# Patient Record
Sex: FEMALE | Race: WHITE | URBAN - METROPOLITAN AREA
[De-identification: names, ages, dates, MRNs, and addresses within clinical notes are randomized per-mention and may not be internally consistent; named-entity substitution may affect disease eponyms.]

---

## 2022-08-22 ENCOUNTER — OFFICE VISIT (OUTPATIENT)
Dept: HEMATOLOGY | Age: 72
End: 2022-08-22
Payer: MEDICARE

## 2022-08-22 ENCOUNTER — HOSPITAL ENCOUNTER (OUTPATIENT)
Dept: LAB | Age: 72
Discharge: HOME | End: 2022-08-22
Payer: MEDICARE

## 2022-08-22 VITALS
TEMPERATURE: 97.5 F | SYSTOLIC BLOOD PRESSURE: 146 MMHG | OXYGEN SATURATION: 98 % | HEIGHT: 62 IN | BODY MASS INDEX: 24.87 KG/M2 | WEIGHT: 135.13 LBS | DIASTOLIC BLOOD PRESSURE: 84 MMHG | HEART RATE: 87 BPM

## 2022-08-22 DIAGNOSIS — R74.8 ELEVATED LIVER ENZYMES: Primary | ICD-10-CM

## 2022-08-22 DIAGNOSIS — Z11.59 ENCOUNTER FOR SCREENING FOR OTHER VIRAL DISEASES: ICD-10-CM

## 2022-08-22 DIAGNOSIS — R94.5 ABNORMAL RESULTS OF LIVER FUNCTION STUDIES: ICD-10-CM

## 2022-08-22 DIAGNOSIS — R74.8 ELEVATED LIVER ENZYMES: ICD-10-CM

## 2022-08-22 PROBLEM — K76.89 LIVER CYST: Status: ACTIVE | Noted: 2022-08-22

## 2022-08-22 PROBLEM — Z90.49 HISTORY OF CHOLECYSTECTOMY: Status: ACTIVE | Noted: 2022-08-22

## 2022-08-22 LAB
ALBUMIN SERPL-MCNC: 3.8 G/DL (ref 3.4–5)
ALBUMIN/GLOB SERPL: 1.2 {RATIO} (ref 0.8–1.7)
ALP SERPL-CCNC: 105 U/L (ref 45–117)
ALT SERPL-CCNC: 35 U/L (ref 13–56)
ANION GAP SERPL CALC-SCNC: 6 MMOL/L (ref 3–18)
AST SERPL-CCNC: 26 U/L (ref 10–38)
BASOPHILS # BLD: 0 K/UL (ref 0–0.1)
BASOPHILS NFR BLD: 0 % (ref 0–2)
BILIRUB DIRECT SERPL-MCNC: 0.2 MG/DL (ref 0–0.2)
BILIRUB SERPL-MCNC: 0.5 MG/DL (ref 0.2–1)
BUN SERPL-MCNC: 11 MG/DL (ref 7–18)
BUN/CREAT SERPL: 17 (ref 12–20)
CALCIUM SERPL-MCNC: 9.1 MG/DL (ref 8.5–10.1)
CHLORIDE SERPL-SCNC: 99 MMOL/L (ref 100–111)
CO2 SERPL-SCNC: 30 MMOL/L (ref 21–32)
CREAT SERPL-MCNC: 0.63 MG/DL (ref 0.6–1.3)
DIFFERENTIAL METHOD BLD: ABNORMAL
EOSINOPHIL # BLD: 0.5 K/UL (ref 0–0.4)
EOSINOPHIL NFR BLD: 6 % (ref 0–5)
ERYTHROCYTE [DISTWIDTH] IN BLOOD BY AUTOMATED COUNT: 14.5 % (ref 11.6–14.5)
FERRITIN SERPL-MCNC: 58 NG/ML (ref 8–388)
GLOBULIN SER CALC-MCNC: 3.2 G/DL (ref 2–4)
GLUCOSE SERPL-MCNC: 129 MG/DL (ref 74–99)
HCT VFR BLD AUTO: 39.4 % (ref 35–45)
HGB BLD-MCNC: 12.6 G/DL (ref 12–16)
IMM GRANULOCYTES # BLD AUTO: 0 K/UL (ref 0–0.04)
IMM GRANULOCYTES NFR BLD AUTO: 0 % (ref 0–0.5)
INR PPP: 0.9 (ref 0.8–1.2)
IRON SATN MFR SERPL: 18 % (ref 20–50)
IRON SERPL-MCNC: 61 UG/DL (ref 50–175)
LYMPHOCYTES # BLD: 1 K/UL (ref 0.9–3.6)
LYMPHOCYTES NFR BLD: 14 % (ref 21–52)
MCH RBC QN AUTO: 28.1 PG (ref 24–34)
MCHC RBC AUTO-ENTMCNC: 32 G/DL (ref 31–37)
MCV RBC AUTO: 87.8 FL (ref 78–100)
MONOCYTES # BLD: 0.7 K/UL (ref 0.05–1.2)
MONOCYTES NFR BLD: 10 % (ref 3–10)
NEUTS SEG # BLD: 4.9 K/UL (ref 1.8–8)
NEUTS SEG NFR BLD: 70 % (ref 40–73)
NRBC # BLD: 0 K/UL (ref 0–0.01)
NRBC BLD-RTO: 0 PER 100 WBC
PLATELET # BLD AUTO: 379 K/UL (ref 135–420)
PMV BLD AUTO: 8.8 FL (ref 9.2–11.8)
POTASSIUM SERPL-SCNC: 3.8 MMOL/L (ref 3.5–5.5)
PROT SERPL-MCNC: 7 G/DL (ref 6.4–8.2)
PROTHROMBIN TIME: 13 SEC (ref 11.5–15.2)
RBC # BLD AUTO: 4.49 M/UL (ref 4.2–5.3)
SODIUM SERPL-SCNC: 135 MMOL/L (ref 136–145)
TIBC SERPL-MCNC: 340 UG/DL (ref 250–450)
WBC # BLD AUTO: 7.1 K/UL (ref 4.6–13.2)

## 2022-08-22 PROCEDURE — 86381 MITOCHONDRIAL ANTIBODY EACH: CPT

## 2022-08-22 PROCEDURE — 1123F ACP DISCUSS/DSCN MKR DOCD: CPT | Performed by: INTERNAL MEDICINE

## 2022-08-22 PROCEDURE — 3017F COLORECTAL CA SCREEN DOC REV: CPT | Performed by: INTERNAL MEDICINE

## 2022-08-22 PROCEDURE — 99204 OFFICE O/P NEW MOD 45 MIN: CPT | Performed by: INTERNAL MEDICINE

## 2022-08-22 PROCEDURE — 86708 HEPATITIS A ANTIBODY: CPT

## 2022-08-22 PROCEDURE — 86704 HEP B CORE ANTIBODY TOTAL: CPT

## 2022-08-22 PROCEDURE — 82103 ALPHA-1-ANTITRYPSIN TOTAL: CPT

## 2022-08-22 PROCEDURE — 83540 ASSAY OF IRON: CPT

## 2022-08-22 PROCEDURE — G8400 PT W/DXA NO RESULTS DOC: HCPCS | Performed by: INTERNAL MEDICINE

## 2022-08-22 PROCEDURE — 86706 HEP B SURFACE ANTIBODY: CPT

## 2022-08-22 PROCEDURE — 86015 ACTIN ANTIBODY EACH: CPT

## 2022-08-22 PROCEDURE — 36415 COLL VENOUS BLD VENIPUNCTURE: CPT

## 2022-08-22 PROCEDURE — 85025 COMPLETE CBC W/AUTO DIFF WBC: CPT

## 2022-08-22 PROCEDURE — 85610 PROTHROMBIN TIME: CPT

## 2022-08-22 PROCEDURE — 82728 ASSAY OF FERRITIN: CPT

## 2022-08-22 PROCEDURE — 86037 ANCA TITER EACH ANTIBODY: CPT

## 2022-08-22 PROCEDURE — 86803 HEPATITIS C AB TEST: CPT

## 2022-08-22 PROCEDURE — 80076 HEPATIC FUNCTION PANEL: CPT

## 2022-08-22 PROCEDURE — G8536 NO DOC ELDER MAL SCRN: HCPCS | Performed by: INTERNAL MEDICINE

## 2022-08-22 PROCEDURE — 1101F PT FALLS ASSESS-DOCD LE1/YR: CPT | Performed by: INTERNAL MEDICINE

## 2022-08-22 PROCEDURE — G8427 DOCREV CUR MEDS BY ELIG CLIN: HCPCS | Performed by: INTERNAL MEDICINE

## 2022-08-22 PROCEDURE — G8420 CALC BMI NORM PARAMETERS: HCPCS | Performed by: INTERNAL MEDICINE

## 2022-08-22 PROCEDURE — 86038 ANTINUCLEAR ANTIBODIES: CPT

## 2022-08-22 PROCEDURE — G0463 HOSPITAL OUTPT CLINIC VISIT: HCPCS | Performed by: INTERNAL MEDICINE

## 2022-08-22 PROCEDURE — G8510 SCR DEP NEG, NO PLAN REQD: HCPCS | Performed by: INTERNAL MEDICINE

## 2022-08-22 PROCEDURE — 80048 BASIC METABOLIC PNL TOTAL CA: CPT

## 2022-08-22 PROCEDURE — 1090F PRES/ABSN URINE INCON ASSESS: CPT | Performed by: INTERNAL MEDICINE

## 2022-08-22 PROCEDURE — 87340 HEPATITIS B SURFACE AG IA: CPT

## 2022-08-22 RX ORDER — ATORVASTATIN CALCIUM 20 MG/1
TABLET, FILM COATED ORAL DAILY
COMMUNITY

## 2022-08-22 RX ORDER — PYRIDOXINE HCL (VITAMIN B6) 100 MG
100 TABLET ORAL DAILY
COMMUNITY

## 2022-08-22 RX ORDER — LOSARTAN POTASSIUM 25 MG/1
TABLET ORAL DAILY
COMMUNITY

## 2022-08-22 RX ORDER — CLOPIDOGREL BISULFATE 75 MG/1
TABLET ORAL
COMMUNITY

## 2022-08-22 RX ORDER — GLUCOSAMINE/CHONDR SU A SOD 750-600 MG
TABLET ORAL
COMMUNITY

## 2022-08-22 RX ORDER — AMLODIPINE BESYLATE 2.5 MG/1
TABLET ORAL DAILY
COMMUNITY

## 2022-08-22 RX ORDER — UREA 10 %
100 LOTION (ML) TOPICAL DAILY
COMMUNITY

## 2022-08-22 RX ORDER — CHOLECALCIFEROL (VITAMIN D3) 50 MCG
CAPSULE ORAL
COMMUNITY

## 2022-08-22 RX ORDER — CHOLECALCIFEROL (VITAMIN D3) 125 MCG
CAPSULE ORAL
COMMUNITY

## 2022-08-22 RX ORDER — OMEPRAZOLE 20 MG/1
20 TABLET, DELAYED RELEASE ORAL DAILY
COMMUNITY

## 2022-08-22 RX ORDER — ESCITALOPRAM OXALATE 5 MG/1
TABLET ORAL DAILY
COMMUNITY

## 2022-08-22 RX ORDER — CYANOCOBALAMIN (VITAMIN B-12) 1000 MCG
1 TABLET, EXTENDED RELEASE ORAL DAILY
COMMUNITY

## 2022-08-22 NOTE — PROGRESS NOTES
[facility] [facility], MD, [address], [facility], [place]      SHANKAR Yin, [facility]     April S Clifton, [facility]   APRIL Singh, [facility]       [facility]aryghulam [facility] 136    at [address]    [address] [place] [address] Ave, [address] Anca Uribe  22.    [phone]    FAX: [address] [place] [address] Avenue    [address], [address], [address]    [phone]    FAX: [phone]           Patient Care Team:  Rabia Valenzuela as PCP - General (Family Medicine)      Problem List  Never Reviewed            Codes Class Noted    History of cholecystectomy ICD-10-CM: Z90.49  ICD-9-CM: V45.79  8/22/2022        Liver cyst ICD-10-CM: K76.89  ICD-9-CM: 573.8  8/22/2022             The clinicians listed above have asked me to see Becky Rosario in consultation regarding a liver cyst.  All medical records sent by the referring physicians were reviewed including imaging studies     The patient is a 66 y.o.  female without any history of previous liver disease. The patient underwent an ultrasound for evaluation of non-specific abdominal pain in 2014. This demonstrated a cyst in the right lobe 2.9 x 2.8 cm and in the left lobe 1.6 x 1.2 cm. A repeat US in 3/2022 demonstrated a few cysts in the liver th largest in the right lobe measuring 5.4 x 6.5 and 6.6 x 5.9 cm     Both of the cysts in the right lobe were aspirated in 4/2022. Both had about 150 cc of clear fluid. She says the abdominal pain she was experiencing resolved. Repeat US in 5/2022 showed 2 cysts inn the right lobe measuring 7 cm and 4.5 cm. A third cysts in the left lobe is 3.6 cm. No liver cancer makers have been ordered to date or the results are not available to me.     The patient does not have any symptoms which could be attributed to the liver disorder. The patient is not experiencing the following symptoms which are commonly seen in this liver disorder:   fatigue,     The patient completes all daily activities without any functional limitations. ASSESSMENT AND PLAN:  The patient has a few medium sized cysts in the right and left lobe. Liver transaminases are normal.  ALP is normal.  Liver function is normal.  The platelet count is normal.      Based upon laboratory studies and imaging the patient does not appear to have liver injury. Serologic testing for causes of chronic liver disease was negative     Liver cysts are common and benign over 99% of the time. They are not responsive to hormonal therapy and HRT or OCH do not need to be stopped. Cysts rarely grow over time but if they do this is usually slow and still doesnot suggest malignancy. The patient felt discomfort and so the cysts were aspirated and she felt relief. Repeat scanning after cyst aspiration suggests the cysts may now be bigger which is not unexpected. Despite this the pain has not recurred. Will repeat CT scan in 6 months just to document the cyst is not growing. If no change in the size or shape of the cyst is seen then no further follow-up is required. Screening for Hepatocellular Carcinoma  HCC screening is not necessary if the patient has no evidence of cirrhosis. Treatment of other medical problems in patients with chronic liver disease  There are no contraindications for the patient to take most medications that are necessary for treatment of other medical issues. Counseling for alcohol in patients with chronic liver disease  The patient was counseled regarding alcohol consumption and the effect of alcohol on chronic liver disease. The patient does not consume any significant amount of alcohol.     Vaccinations   Since the patient does not have a chronic liver disease which can lead to liver injury screening for HAV and HBV is not needed. Routine vaccinations against other bacterial and viral agents can be performed as indicated. Annual flu vaccination should be administered if indicated. ALLERGIES  Not on File    MEDICATIONS  Current Outpatient Medications   Medication Sig    omeprazole (PriLOSEC OTC) 20 mg tablet Take 20 mg by mouth daily. losartan (COZAAR) 25 mg tablet Take  by mouth daily. clopidogreL (Plavix) 75 mg tab Take  by mouth. atorvastatin (Lipitor) 20 mg tablet Take  by mouth daily. escitalopram oxalate (Lexapro) 5 mg tablet Take  by mouth daily. amLODIPine (NORVASC) 2.5 mg tablet Take  by mouth daily. iron, carbonyl (FEOSOL) 45 mg tab Take 1 Tablet by mouth daily. cholecalciferol, vitamin D3, (Vitamin D3) 50 mcg (2,000 unit) tab Take  by mouth.    pyridoxine, vitamin B6, (Vitamin B-6) 100 mg tablet Take 100 mg by mouth daily. cyanocobalamin (Vitamin B-12) 100 mcg tablet Take 100 mcg by mouth daily. Biotin 2,500 mcg cap Take  by mouth.    calcium carb/vitamin D3/vit K1 (VIACTIV PO) Take  by mouth. B.animalis,bifid,infantis,long (Probiotic 4X) 10-15 mg TbEC Take  by mouth. No current facility-administered medications for this visit. SYSTEM REVIEW NOT RELATED TO LIVER DISEASE OR REVIEWED ABOVE:  Constitution systems: Negative for fever, chills, weight gain, weight loss. Eyes: Negative for visual changes. ENT: Negative for sore throat, painful swallowing. Respiratory: Negative for cough, hemoptysis, SOB. Cardiology: Negative for chest pain, palpitations. GI:  Negative for constipation or diarrhea. : Negative for urinary frequency, dysuria, hematuria, nocturia. Skin: Negative for rash. Hematology: Negative for easy bruising, blood clots. Musculo-skelatal: Negative for back pain, muscle pain, weakness. Neurologic: Negative for headaches, dizziness, vertigo, memory problems not related to HE.   Psychology: Negative for anxiety, depression. FAMILY HISTORY:  The father  of MI. The mother  at age 80 years of sepsis. There is no family history of liver disease. SOCIAL HISTORY:  The patient is . The patient has 2 children, and 2 grandchildren. The patient has never used tobacco products. The patient consumes 3-5 alcoholic beverages per week. The patient currently works part time as as realtor/property staging . PHYSICAL EXAMINATION:  Visit Vitals  BP (!) 146/84   Pulse 87   Temp 97.5 °F (36.4 °C) (Tympanic)   Ht 5' 2\" (1.575 m)   Wt 135 lb 2 oz (61.3 kg)   SpO2 98%   BMI 24.71 kg/m²       General: No acute distress. Eyes: Sclera anicteric. ENT: No oral lesions. Thyroid normal.  Nodes: No adenopathy. Skin: No spider angiomata. No jaundice. No palmar erythema. Respiratory: Lungs clear to auscultation. Cardiovascular: Regular heart rate. No murmurs. No JVD. Abdomen: Soft non-tender, liver size normal to percussion/palpation. Spleen not palpable. No obvious ascites. Extremities: No edema. No muscle wasting. No gross arthritic changes. Neurologic: Alert and oriented. Cranial nerves grossly intact. No asterixis.       LABORATORY STUDIES:  Liver [place] of [address] Units 2022   WBC 4.6 - 13.2 K/uL 7.1   ANC 1.8 - 8.0 K/UL 4.9   HGB 12.0 - 16.0 g/dL 12.6    - 420 K/uL 379   INR 0.8 - 1.2   0.9   AST 10 - 38 U/L 26   ALT 13 - 56 U/L 35   Alk Phos 45 - 117 U/L 105   Bili, Total 0.2 - 1.0 MG/DL 0.5   Bili, Direct 0.0 - 0.2 MG/DL 0.2   Albumin 3.4 - 5.0 g/dL 3.8   BUN 7.0 - 18 MG/DL 11   Creat 0.6 - 1.3 MG/DL 0.63   Na 136 - 145 mmol/L 135 (L)   K 3.5 - 5.5 mmol/L 3.8   Cl 100 - 111 mmol/L 99 (L)   CO2 21 - 32 mmol/L 30   Glucose 74 - 99 mg/dL 129 (H)       SEROLOGIES:  Serologies Latest Ref Rng & Units 2022   Hep A Ab, Total Negative   Negative   Hep B Surface Ag <1.00 Index <0.10   Hep B Surface Ag Interp NEG   Negative   Hep B Core Ab, Total Negative   Negative   Hep B Surface Ab >10.0 mIU/mL <3.10 (L)   Hep B Surface Ab Interp POS   Negative (A)   Hep C Ab 0.0 - 0.9 s/co ratio <0.1   Ferritin 8 - 388 NG/ML 58   Iron % Saturation 20 - 50 % 18 (L)   GOSIA, IFA  Negative   C-ANCA Neg:<1:20 titer <1:20   P-ANCA Neg:<1:20 titer <1:20   ANCA Neg:<1:20 titer <1:20   ASMCA 0 - 19 Units 3   M2 Ab 0.0 - 20.0 Units <20.0   Alpha-1 antitrypsin level 101 - 187 mg/dL 144     LIVER HISTOLOGY:  Not available or performed    ENDOSCOPIC PROCEDURES:  Not available or performed    RADIOLOGY:  Not available or performed    OTHER TESTING:  Not available or performed    FOLLOW-UP:  All of the issues listed above in the Assessment and Plan were discussed with the patient. All questions were answered. The patient expressed a clear understanding of the above. [address] [place] [address] in 4 weeks for [facility] to review all data and determine the treatment plan.       MD Esperanza Miller [address], [address] [phone]  FAX: [address] [place] [address]

## 2022-08-22 NOTE — Clinical Note
9/14/2022    Patient: Acacia Sanon   YOB: 1950   Date of Visit: 8/22/2022     Tobias Khan MD  [address] [place] [id number]  Via Fax: [phone]    Dear Tobias Khan MD,      Thank you for referring Ms. Claudia Fall to [address] [place] [address],[address] for evaluation. My notes for this consultation are attached. If you have questions, please do not hesitate to call me. I look forward to following your patient along with you.       Sincerely,    Nolan Cushing, MD

## 2022-08-23 LAB
A1AT SERPL-MCNC: 144 MG/DL (ref 101–187)
C-ANCA TITR SER IF: NORMAL TITER
HAV AB SER QL IA: NEGATIVE
HBV CORE AB SERPL QL IA: NEGATIVE
HBV SURFACE AB SER QL IA: NEGATIVE
HBV SURFACE AB SERPL IA-ACNC: <3.1 MIU/ML
HBV SURFACE AG SER QL: <0.1 INDEX
HBV SURFACE AG SER QL: NEGATIVE
HCV AB S/CO SERPL IA: <0.1 S/CO RATIO (ref 0–0.9)
HCV AB SERPL QL IA: NORMAL
HEP BS AB COMMENT,HBSAC: ABNORMAL
MITOCHONDRIA M2 IGG SER-ACNC: <20 UNITS (ref 0–20)
P-ANCA ATYPICAL TITR SER IF: NORMAL TITER
P-ANCA TITR SER IF: NORMAL TITER

## 2022-08-24 LAB — SMA IGG SER-ACNC: 3 UNITS (ref 0–19)

## 2022-08-30 LAB — ANA TITR SER IF: NEGATIVE {TITER}

## 2022-10-12 ENCOUNTER — OFFICE VISIT (OUTPATIENT)
Dept: HEMATOLOGY | Age: 72
End: 2022-10-12
Payer: MEDICARE

## 2022-10-12 VITALS
WEIGHT: 134 LBS | SYSTOLIC BLOOD PRESSURE: 164 MMHG | TEMPERATURE: 97.5 F | BODY MASS INDEX: 24.51 KG/M2 | HEART RATE: 74 BPM | OXYGEN SATURATION: 98 % | DIASTOLIC BLOOD PRESSURE: 88 MMHG

## 2022-10-12 DIAGNOSIS — K76.89 LIVER CYST: Primary | ICD-10-CM

## 2022-10-12 PROCEDURE — G8427 DOCREV CUR MEDS BY ELIG CLIN: HCPCS | Performed by: INTERNAL MEDICINE

## 2022-10-12 PROCEDURE — 1090F PRES/ABSN URINE INCON ASSESS: CPT | Performed by: INTERNAL MEDICINE

## 2022-10-12 PROCEDURE — G8420 CALC BMI NORM PARAMETERS: HCPCS | Performed by: INTERNAL MEDICINE

## 2022-10-12 PROCEDURE — 1123F ACP DISCUSS/DSCN MKR DOCD: CPT | Performed by: INTERNAL MEDICINE

## 2022-10-12 PROCEDURE — G0463 HOSPITAL OUTPT CLINIC VISIT: HCPCS | Performed by: INTERNAL MEDICINE

## 2022-10-12 PROCEDURE — 99213 OFFICE O/P EST LOW 20 MIN: CPT | Performed by: INTERNAL MEDICINE

## 2022-10-12 PROCEDURE — 3017F COLORECTAL CA SCREEN DOC REV: CPT | Performed by: INTERNAL MEDICINE

## 2022-10-12 PROCEDURE — G8536 NO DOC ELDER MAL SCRN: HCPCS | Performed by: INTERNAL MEDICINE

## 2022-10-12 PROCEDURE — G8432 DEP SCR NOT DOC, RNG: HCPCS | Performed by: INTERNAL MEDICINE

## 2022-10-12 PROCEDURE — G8400 PT W/DXA NO RESULTS DOC: HCPCS | Performed by: INTERNAL MEDICINE

## 2022-10-12 PROCEDURE — 1101F PT FALLS ASSESS-DOCD LE1/YR: CPT | Performed by: INTERNAL MEDICINE

## 2022-10-12 NOTE — PROGRESS NOTES
3340 [facility], MD, [address], [place], [place]      SHANKAR Simmons, [facility]-BC   [facility]   Alba Wooten, FNP-C Romana Kilts, APRIL Harris, [facility]       Hilary [facility] 136    at [address], [facility] Anca Uribe  22.    [phone]    FAX: [address] [place] [address], [address]    [phone]    FAX: [phone]         Patient Care Team:  Andrade Goldman as PCP - General (Family Medicine)      Problem List  Date Reviewed: 8/22/2022            Codes Class Noted    History of cholecystectomy ICD-10-CM: Z90.49  ICD-9-CM: V45.79  8/22/2022        Liver cyst ICD-10-CM: K76.89  ICD-9-CM: 573.8  8/22/2022             [place] ANASTACIA Negreteleslie is being seen at [address] for management of a single Liver cyst.  The active problem list, all pertinent past medical history, medications, radiologic findings and laboratory findings related to the liver disorder were reviewed and discussed with the patient. The patient is a 66 y.o.  female without any history of previous liver disease. The patient underwent an ultrasound for evaluation of non-specific abdominal pain in 2014. This demonstrated a cyst in the right lobe 2.9 x 2.8 cm and in the left lobe 1.6 x 1.2 cm. A repeat US in 3/2022 demonstrated a few cysts in the liver the largest in the right lobe measuring 5.4 x 6.5 and 6.6 x 5.9 cm     Both of the cysts in the right lobe were aspirated in 4/2022. Both had about 150 cc of clear fluid. She says the abdominal pain she was experiencing resolved. Repeat US in 5/2022 showed 2 cysts inn the right lobe measuring 7 cm and 4.5 cm.   A third cysts in the left lobe is 3.6 cm.    The patient does not have any symptoms which could be attributed to the liver disorder. The patient is not experiencing the following symptoms which are commonly seen in this liver disorder:   fatigue,     The patient completes all daily activities without any functional limitations. ASSESSMENT AND PLAN:  The patient has a few medium sized cysts in the right and left lobe. Liver transaminases are normal.  ALP is normal.  Liver function is normal.  The platelet count is normal.      Based upon laboratory studies and imaging the patient does not appear to have liver injury. Serologic testing for causes of chronic liver disease was negative     Liver cysts are common and benign over 99% of the time. They are not responsive to hormonal therapy and HRT or OCH do not need to be stopped. Cysts rarely grow over time but if they do this is usually slow and still doesnot suggest malignancy. The patient felt discomfort and so the cysts were aspirated and she felt relief. Cysts will accumuate fluid after they are aspirated. The time course for reaccumulation of fluid and development of recurent symptoms is highly variable, from weeks to months. Rapid reccurence of symptoms would be an indication for surgical removal of the cyst.     The patient remains asymptomatic. Screening for Hepatocellular Carcinoma  HCC screening is not necessary if the patient has no evidence of cirrhosis. Treatment of other medical problems in patients with chronic liver disease  There are no contraindications for the patient to take most medications that are necessary for treatment of other medical issues. Counseling for alcohol in patients with chronic liver disease  The patient was counseled regarding alcohol consumption and the effect of alcohol on chronic liver disease. The patient does not consume any significant amount of alcohol.     Vaccinations   Since the patient does not have a chronic liver disease which can lead to liver injury screening for HAV and HBV is not needed. Routine vaccinations against other bacterial and viral agents can be performed as indicated. Annual flu vaccination should be administered if indicated. ALLERGIES  Not on File    MEDICATIONS  Current Outpatient Medications   Medication Sig    omeprazole (PRILOSEC OTC) 20 mg tablet Take 20 mg by mouth daily. losartan (COZAAR) 25 mg tablet Take  by mouth daily. clopidogreL (PLAVIX) 75 mg tab Take  by mouth. atorvastatin (LIPITOR) 20 mg tablet Take  by mouth daily. escitalopram oxalate (LEXAPRO) 5 mg tablet Take  by mouth daily. amLODIPine (NORVASC) 2.5 mg tablet Take  by mouth daily. iron, carbonyl (FEOSOL) 45 mg tab Take 1 Tablet by mouth daily. cholecalciferol, vitamin D3, 50 mcg (2,000 unit) tab Take  by mouth.    pyridoxine, vitamin B6, (VITAMIN B-6) 100 mg tablet Take 100 mg by mouth daily. cyanocobalamin (VITAMIN B12) 100 mcg tablet Take 100 mcg by mouth daily. Biotin 2,500 mcg cap Take  by mouth.    calcium carb/vitamin D3/vit K1 (VIACTIV PO) Take  by mouth. B.animalis,bifid,infantis,long (Probiotic 4X) 10-15 mg TbEC Take  by mouth. No current facility-administered medications for this visit. SYSTEM REVIEW NOT RELATED TO LIVER DISEASE OR REVIEWED ABOVE:  Constitution systems: Negative for fever, chills, weight gain, weight loss. Eyes: Negative for visual changes. ENT: Negative for sore throat, painful swallowing. Respiratory: Negative for cough, hemoptysis, SOB. Cardiology: Negative for chest pain, palpitations. GI:  Negative for constipation or diarrhea. : Negative for urinary frequency, dysuria, hematuria, nocturia. Skin: Negative for rash. Hematology: Negative for easy bruising, blood clots. Musculo-skelatal: Negative for back pain, muscle pain, weakness. Neurologic: Negative for headaches, dizziness, vertigo, memory problems not related to HE.   Psychology: Negative for anxiety, depression. FAMILY HISTORY:  The father  of MI. The mother  at age 80 years of sepsis. There is no family history of liver disease. SOCIAL HISTORY:  The patient is . The patient has 2 children, and 2 grandchildren. The patient has never used tobacco products. The patient consumes 3-5 alcoholic beverages per week. The patient currently works part time as as realtor/property staging . PHYSICAL EXAMINATION:  Visit Vitals  BP (!) 164/88   Pulse 74   Temp 97.5 °F (36.4 °C) (Tympanic)   Wt 134 lb (60.8 kg)   SpO2 98%   BMI 24.51 kg/m²       General: No acute distress. Eyes: Sclera anicteric. ENT: No oral lesions. Thyroid normal.  Nodes: No adenopathy. Skin: No spider angiomata. No jaundice. No palmar erythema. Respiratory: Lungs clear to auscultation. Cardiovascular: Regular heart rate. No murmurs. No JVD. Abdomen: Soft non-tender, liver size normal to percussion/palpation. Spleen not palpable. No obvious ascites. Extremities: No edema. No muscle wasting. No gross arthritic changes. Neurologic: Alert and oriented. Cranial nerves grossly intact. No asterixis.       LABORATORY STUDIES:  Liver [place] of [address] [place] [address] 2022   WBC 4.6 - 13.2 K/uL 7.1   ANC 1.8 - 8.0 K/UL 4.9   HGB 12.0 - 16.0 g/dL 12.6    - 420 K/uL 379   INR 0.8 - 1.2   0.9   AST 10 - 38 U/L 26   ALT 13 - 56 U/L 35   Alk Phos 45 - 117 U/L 105   Bili, Total 0.2 - 1.0 MG/DL 0.5   Bili, Direct 0.0 - 0.2 MG/DL 0.2   Albumin 3.4 - 5.0 g/dL 3.8   BUN 7.0 - 18 MG/DL 11   Creat 0.6 - 1.3 MG/DL 0.63   Na 136 - 145 mmol/L 135 (L)   K 3.5 - 5.5 mmol/L 3.8   Cl 100 - 111 mmol/L 99 (L)   CO2 21 - 32 mmol/L 30   Glucose 74 - 99 mg/dL 129 (H)       SEROLOGIES:  Serologies Latest Ref Rng & Units 2022   Hep A Ab, Total Negative   Negative   Hep B Surface Ag <1.00 Index <0.10   Hep B Surface Ag Interp NEG   Negative   Hep B Core Ab, Total Negative   Negative Hep B Surface Ab >10.0 mIU/mL <3.10 (L)   Hep B Surface Ab Interp POS   Negative (A)   Hep C Ab 0.0 - 0.9 s/co ratio <0.1   Ferritin 8 - 388 NG/ML 58   Iron % Saturation 20 - 50 % 18 (L)   GOSIA, IFA  Negative   C-ANCA Neg:<1:20 titer <1:20   P-ANCA Neg:<1:20 titer <1:20   ANCA Neg:<1:20 titer <1:20   ASMCA 0 - 19 Units 3   M2 Ab 0.0 - 20.0 Units <20.0   Alpha-1 antitrypsin level 101 - 187 mg/dL 144     LIVER HISTOLOGY:  Not available or performed    ENDOSCOPIC PROCEDURES:  Not available or performed    RADIOLOGY:  Not available or performed    OTHER TESTING:  Not available or performed    FOLLOW-UP:  All of the issues listed above in the Assessment and Plan were discussed with the patient. All questions were answered. The patient expressed a clear understanding of the above. [facility] is needed. I would be glad to see the patient back for follow-up at any time in the future if the clinical situation changes.       MD Esperanza Sanchez [address], [address] [place] [address] Street [address] [phone]  FAX: [address] [place] [address]

## 2024-07-09 RX ORDER — CHOLESTYRAMINE 4 G/9G
POWDER, FOR SUSPENSION ORAL
COMMUNITY
Start: 2020-03-01

## 2024-07-09 RX ORDER — CHOLECALCIFEROL (VITAMIN D3) 125 MCG
CAPSULE ORAL
COMMUNITY

## 2024-07-09 RX ORDER — CYCLOSPORINE 0.5 MG/ML
EMULSION OPHTHALMIC
COMMUNITY
Start: 2023-12-04

## 2024-07-09 RX ORDER — ALPRAZOLAM 0.25 MG/1
TABLET ORAL
COMMUNITY
Start: 2024-06-21

## 2024-07-09 RX ORDER — CONJUGATED ESTROGENS 0.62 MG/G
CREAM VAGINAL
COMMUNITY
Start: 2017-04-18

## 2024-07-09 RX ORDER — POTASSIUM CHLORIDE 750 MG/1
TABLET, FILM COATED, EXTENDED RELEASE ORAL
COMMUNITY
Start: 2024-05-29

## 2024-07-10 ENCOUNTER — OFFICE VISIT (OUTPATIENT)
Age: 74
End: 2024-07-10
Payer: MEDICARE

## 2024-07-10 VITALS
HEART RATE: 69 BPM | SYSTOLIC BLOOD PRESSURE: 142 MMHG | DIASTOLIC BLOOD PRESSURE: 76 MMHG | WEIGHT: 123 LBS | HEIGHT: 62 IN | BODY MASS INDEX: 22.63 KG/M2 | OXYGEN SATURATION: 98 % | TEMPERATURE: 96.8 F

## 2024-07-10 DIAGNOSIS — K76.89 LIVER CYST: Primary | ICD-10-CM

## 2024-07-10 PROCEDURE — G8420 CALC BMI NORM PARAMETERS: HCPCS | Performed by: INTERNAL MEDICINE

## 2024-07-10 PROCEDURE — 1123F ACP DISCUSS/DSCN MKR DOCD: CPT | Performed by: INTERNAL MEDICINE

## 2024-07-10 PROCEDURE — G8427 DOCREV CUR MEDS BY ELIG CLIN: HCPCS | Performed by: INTERNAL MEDICINE

## 2024-07-10 PROCEDURE — G8400 PT W/DXA NO RESULTS DOC: HCPCS | Performed by: INTERNAL MEDICINE

## 2024-07-10 PROCEDURE — 3017F COLORECTAL CA SCREEN DOC REV: CPT | Performed by: INTERNAL MEDICINE

## 2024-07-10 PROCEDURE — 1036F TOBACCO NON-USER: CPT | Performed by: INTERNAL MEDICINE

## 2024-07-10 PROCEDURE — 99214 OFFICE O/P EST MOD 30 MIN: CPT | Performed by: INTERNAL MEDICINE

## 2024-07-10 PROCEDURE — 1090F PRES/ABSN URINE INCON ASSESS: CPT | Performed by: INTERNAL MEDICINE

## 2024-07-10 RX ORDER — METOCLOPRAMIDE 10 MG/1
10 TABLET ORAL ONCE
COMMUNITY
Start: 2024-06-12

## 2024-07-10 NOTE — PROGRESS NOTES
regarding alcohol consumption and the effect of alcohol on chronic liver disease.  The patient does not consume any significant amount of alcohol.    Vaccinations   Since the patient does not have a chronic liver disease which can lead to liver injury screening for HAV and HBV is not needed.    Routine vaccinations against other bacterial and viral agents can be performed as indicated.  Annual flu vaccination should be administered if indicated.      ALLERGIES  Not on File    MEDICATIONS  Current Outpatient Medications   Medication Sig    omeprazole (PRILOSEC OTC) 20 mg tablet Take 20 mg by mouth daily.    losartan (COZAAR) 25 mg tablet Take  by mouth daily.    clopidogreL (PLAVIX) 75 mg tab Take  by mouth.    atorvastatin (LIPITOR) 20 mg tablet Take  by mouth daily.    escitalopram oxalate (LEXAPRO) 5 mg tablet Take  by mouth daily.    amLODIPine (NORVASC) 2.5 mg tablet Take  by mouth daily.    iron, carbonyl (FEOSOL) 45 mg tab Take 1 Tablet by mouth daily.    cholecalciferol, vitamin D3, 50 mcg (2,000 unit) tab Take  by mouth.    pyridoxine, vitamin B6, (VITAMIN B-6) 100 mg tablet Take 100 mg by mouth daily.    cyanocobalamin (VITAMIN B12) 100 mcg tablet Take 100 mcg by mouth daily.    Biotin 2,500 mcg cap Take  by mouth.    calcium carb/vitamin D3/vit K1 (VIACTIV PO) Take  by mouth.    B.animalis,bifid,infantis,long (Probiotic 4X) 10-15 mg TbEC Take  by mouth.     No current facility-administered medications for this visit.       SYSTEM REVIEW NOT RELATED TO LIVER DISEASE OR REVIEWED ABOVE:  Constitution systems: Negative for fever, chills, weight gain, weight loss.   Eyes: Negative for visual changes.  ENT: Negative for sore throat, painful swallowing.   Respiratory: Negative for cough, hemoptysis, SOB.   Cardiology: Negative for chest pain, palpitations.  GI:  Negative for constipation or diarrhea.  : Negative for urinary frequency, dysuria, hematuria, nocturia.   Skin: Negative for rash.  Hematology: